# Patient Record
(demographics unavailable — no encounter records)

---

## 2024-12-28 NOTE — HISTORY OF PRESENT ILLNESS
[FreeTextEntry6] : [facility] 12/26 for "high fever" presenting for follow up. "6 y.o M s/ no sig pmhx p/w 2 days of sore throat and nasal congestion with fever. Mother treating fever with tylenol and motrin. Pt still tolerating PO but pt states that intake hurts his throat. No cough. A/P: sore throat with erythema and exudates. Suspect strep. Will send swab." Strep pcr was positive.  No fever since yesterday.  Taking the amoxicillin.  Also with constipation for 3 weeks.  Was using pedialax suppository.  Started using pedialax laxative chewable last week.  Blood in stool, mom reports fissure.  Stools are really big and hard.  Likes veggies and fruits and water.  Last stool was yesterday.    Subjective: - Summary : Pediatric patient visited emergency room two days ago for high fever. Today's consultation is a routine checkup. - Chief Complaint (CC) : Patient complains of pain on one side, severe constipation, occasional shivering after taking antibiotics, and recent appearance of white spots on the skin. - History of Present Illness : Patient primarily presents with constipation and associated lower abdominal pain for the past month. Recently diagnosed with strep throat following high fever. - Past Medical History : No significant history. - Past Surgical History : No surgeries. - Family History : No significant family history. - Social History : No details discussed. - Review of Systems : General Present, Neurological Present, Musculoskeletal Present, Cardiovascular Not mentioned, Respiratory Not mentioned, Gastrointestinal Severe constipation, Genitourinary Not mentioned, Integumentary White spots on skin, Psychiatric Not mentioned. - Medications : Antibiotics for strep throat, chewable laxatives, and PDLax for constipation. - Allergies : No allergies mentioned. Objective: - Diagnostic Results : No additional diagnostic results obtained during current visit. - Vital Signs : Not provided. - Physical Examination (PE) : White spots on skin, recurrent oral ulcer, soft and non-tender belly. Assessment and Plan: - Constipation : Switch to daily fiber and probiotic intake. Prescribe Culturelle, a combined fiber and probiotic supplement. Prescribe Miralax powder to be mixed with water. Continue fiber and probiotic regimen indefinitely. Refer to gastroenterologist if symptoms persist or blood in stool is present after a month. - Strep Throat : Continue antibiotics for a full 10 days. Revert to Tylenol or Motrin for fever if it returns. - Skin Lesions : No cause for concern currently. Further evaluation may be required if symptoms persist or cause discomfort. - Oral Ulcer : Advise use of salt water rinses and application of Benadryl. Continue antibiotics schaeffer

## 2025-02-14 NOTE — HISTORY OF PRESENT ILLNESS
[FreeTextEntry6] : 1-2 days of nbnb emesis and diarrhea, little cough. vomiting resolved. last night with chills.  decreased po intake however trying to hydrate well.   parents worried that AMIRSHOX appetite is low for the past few days, even before recent illness. parents asking for appetite stimulant medication.  no sob or difficulty breathing, joint pain or swelling, rash, urinary symptoms, ear pain or tugging

## 2025-02-14 NOTE — DISCUSSION/SUMMARY
[FreeTextEntry1] : Viral: Influenza, sister at the same visit positive for Influenza A, rapid test. Recommend supportive care including antipyretics, fluids, OTC cough/cold medications if age-appropriate, and nasal saline followed by nasal suction. Discussed risks/benefits of Tamiflu. Patient to be brought to the ED if has persistent decreased oral intake, decrease in wet diapers, fever >100.4F or becomes patient becomes lethargic or changed in mental status and alertness. To note if fever > 5 days must be seen immediately either in clinic or in ED.  Extensive discussion and counseling with parents regarding decreased appetite - can be expected during viral illness, more important to stay hydrated. When feeling better, appetite will improve. No indication for appetite stimulants at this time. Advised mv routinely.    Caretaker expressed understanding of the plan and agrees. No other concerns or questions today.

## 2025-03-26 NOTE — HISTORY OF PRESENT ILLNESS
[de-identified] : sick [FreeTextEntry6] : c/o fever (101 max), slight cough, slight diarrhea, HA, runny nose ibuprofen 1pm no sick contacts  tolerating PO fluids well

## 2025-04-09 NOTE — PHYSICAL EXAM
[Clear Rhinorrhea] : clear rhinorrhea [Erythematous Oropharynx] : erythematous oropharynx [Wheezing] : wheezing [Transmitted Upper Airway Sounds] : transmitted upper airway sounds [NL] : warm, clear

## 2025-04-09 NOTE — HISTORY OF PRESENT ILLNESS
[de-identified] : He was treated with antibiotic for his strep throat and still complaining of colds and cough

## 2025-04-28 NOTE — DISCUSSION/SUMMARY
[FreeTextEntry1] : 5y/o with otalgia Ear exam wnl Some allergic shiners - possible allergies causing complaints Dad hesitant to give meds if not needed - recommended seeing how he does, but if not improving to try claritin or zyrtec.  Dad agrees - Return precautions reviewed including for worsening or persisting symptoms. Patient to seek medical attention in ED if has decreased oral intake, decrease in wet diapers/voids, fever >100.4F, difficulty breathing, becomes lethargic, or has a change in mental status or alertness. To note if fever > 5 days must be seen immediately either in clinic or in ED. - Return/ED precautions given.  RTC routine and prn.  Caretaker expressed understanding and all questions answered.

## 2025-04-28 NOTE — HISTORY OF PRESENT ILLNESS
[de-identified] : sick [FreeTextEntry6] : 7y/o M pmhx asthma p/w left ear pain at school today.  No fever.   Has congestion.  Cough improving from last time.  Reports in the past has used Claritin but can't remember if it helped.